# Patient Record
Sex: MALE | Race: OTHER | Employment: UNEMPLOYED | URBAN - METROPOLITAN AREA
[De-identification: names, ages, dates, MRNs, and addresses within clinical notes are randomized per-mention and may not be internally consistent; named-entity substitution may affect disease eponyms.]

---

## 2021-04-25 ENCOUNTER — APPOINTMENT (OUTPATIENT)
Dept: CT IMAGING | Facility: HOSPITAL | Age: 36
End: 2021-04-25
Attending: EMERGENCY MEDICINE
Payer: MEDICARE

## 2021-04-25 ENCOUNTER — APPOINTMENT (OUTPATIENT)
Dept: GENERAL RADIOLOGY | Facility: HOSPITAL | Age: 36
End: 2021-04-25
Attending: EMERGENCY MEDICINE
Payer: MEDICARE

## 2021-04-25 ENCOUNTER — HOSPITAL ENCOUNTER (EMERGENCY)
Facility: HOSPITAL | Age: 36
Discharge: HOME | End: 2021-04-25
Attending: EMERGENCY MEDICINE
Payer: MEDICARE

## 2021-04-25 VITALS
WEIGHT: 255 LBS | SYSTOLIC BLOOD PRESSURE: 136 MMHG | DIASTOLIC BLOOD PRESSURE: 87 MMHG | HEART RATE: 83 BPM | RESPIRATION RATE: 16 BRPM | BODY MASS INDEX: 37.77 KG/M2 | TEMPERATURE: 98 F | HEIGHT: 69 IN | OXYGEN SATURATION: 97 %

## 2021-04-25 DIAGNOSIS — R91.1 PULMONARY NODULE: ICD-10-CM

## 2021-04-25 DIAGNOSIS — R07.89 CHEST WALL PAIN: Primary | ICD-10-CM

## 2021-04-25 PROCEDURE — 96374 THER/PROPH/DIAG INJ IV PUSH: CPT

## 2021-04-25 PROCEDURE — 99285 EMERGENCY DEPT VISIT HI MDM: CPT

## 2021-04-25 PROCEDURE — 80048 BASIC METABOLIC PNL TOTAL CA: CPT | Performed by: EMERGENCY MEDICINE

## 2021-04-25 PROCEDURE — 93010 ELECTROCARDIOGRAM REPORT: CPT | Performed by: EMERGENCY MEDICINE

## 2021-04-25 PROCEDURE — 84484 ASSAY OF TROPONIN QUANT: CPT | Performed by: EMERGENCY MEDICINE

## 2021-04-25 PROCEDURE — 71260 CT THORAX DX C+: CPT | Performed by: EMERGENCY MEDICINE

## 2021-04-25 PROCEDURE — 71045 X-RAY EXAM CHEST 1 VIEW: CPT | Performed by: EMERGENCY MEDICINE

## 2021-04-25 PROCEDURE — 93005 ELECTROCARDIOGRAM TRACING: CPT

## 2021-04-25 PROCEDURE — 85025 COMPLETE CBC W/AUTO DIFF WBC: CPT | Performed by: EMERGENCY MEDICINE

## 2021-04-25 PROCEDURE — 85379 FIBRIN DEGRADATION QUANT: CPT | Performed by: EMERGENCY MEDICINE

## 2021-04-25 RX ORDER — KETOROLAC TROMETHAMINE 15 MG/ML
15 INJECTION, SOLUTION INTRAMUSCULAR; INTRAVENOUS ONCE
Status: COMPLETED | OUTPATIENT
Start: 2021-04-25 | End: 2021-04-25
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
## 2021-04-25 NOTE — ED PROVIDER NOTES
Patient Seen in: [facility] AND [facility] Emergency Department      History   Patient presents with:  Chest Pain Angina    Stated Complaint: cp     HPI/Subjective:   HPI    Pt is 28 yo M with recent history of high blood pressure who presents with substernal ch tender  CV: RRR, no murmurs. +TTP over sternum.  2+ radial pulses b/l  Resp: CTAB, no wheezes or retractions  Ab: soft, nontender, no distension  Extremities: FROM of all extremities, no cyanosis/clubbing/edema  Neuro: CN intact, normal speech, normal gait, acute pulmonary embolism to the level of the first order subsegmental pulmonary artery branches.   2. There is an ovoid nodule in the periphery of the right lower lobe; in a patient of this age, this may be incidental. A follow-up study can be obtained in 1 lifting at home. He feels comfortable with discharge plan with close follow-up. Advised on strict return precautions.                            Disposition and Plan     Clinical Impression:  Chest wall pain  (primary encounter diagnosis)  Pulmonary nodul

## 2023-01-04 ENCOUNTER — APPOINTMENT (OUTPATIENT)
Dept: GENERAL RADIOLOGY | Facility: HOSPITAL | Age: 38
End: 2023-01-04
Payer: COMMERCIAL

## 2023-01-04 ENCOUNTER — HOSPITAL ENCOUNTER (EMERGENCY)
Facility: HOSPITAL | Age: 38
Discharge: HOME | End: 2023-01-04
Attending: EMERGENCY MEDICINE
Payer: COMMERCIAL

## 2023-01-04 VITALS
SYSTOLIC BLOOD PRESSURE: 159 MMHG | HEART RATE: 94 BPM | OXYGEN SATURATION: 98 % | HEIGHT: 69 IN | DIASTOLIC BLOOD PRESSURE: 97 MMHG | WEIGHT: 256 LBS | RESPIRATION RATE: 22 BRPM | TEMPERATURE: 99 F | BODY MASS INDEX: 37.92 KG/M2

## 2023-01-04 DIAGNOSIS — R07.9 RIGHT-SIDED CHEST PAIN: Primary | ICD-10-CM

## 2023-01-04 DIAGNOSIS — R07.9 CHEST PAIN OF UNCERTAIN ETIOLOGY: ICD-10-CM
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
## 2023-01-04 LAB
ANION GAP SERPL CALC-SCNC: 5 MMOL/L (ref 0–18)
ATRIAL RATE: 88 BPM
BASOPHILS # BLD AUTO: 0.07 X10(3) UL (ref 0–0.2)
BASOPHILS NFR BLD AUTO: 0.8 %
BUN BLD-MCNC: 11 MG/DL (ref 7–18)
BUN/CREAT SERPL: 9.6 (ref 10–20)
CALCIUM BLD-MCNC: 9.4 MG/DL (ref 8.5–10.1)
CHLORIDE SERPL-SCNC: 107 MMOL/L (ref 98–112)
CO2 SERPL-SCNC: 29 MMOL/L (ref 21–32)
CREAT BLD-MCNC: 1.15 MG/DL
D DIMER PPP FEU-MCNC: 0.36 UG/ML FEU (ref ?–0.5)
DEPRECATED RDW RBC AUTO: 39.2 FL (ref 35.1–46.3)
EOSINOPHIL # BLD AUTO: 0.1 X10(3) UL (ref 0–0.7)
EOSINOPHIL NFR BLD AUTO: 1.1 %
ERYTHROCYTE [DISTWIDTH] IN BLOOD BY AUTOMATED COUNT: 12.3 % (ref 11–15)
GFR SERPLBLD BASED ON 1.73 SQ M-ARVRAT: 85 ML/MIN/1.73M2 (ref 60–?)
GLUCOSE BLD-MCNC: 92 MG/DL (ref 70–99)
HCT VFR BLD AUTO: 46.9 %
HGB BLD-MCNC: 16 G/DL
IMM GRANULOCYTES # BLD AUTO: 0.02 X10(3) UL (ref 0–1)
IMM GRANULOCYTES NFR BLD: 0.2 %
LYMPHOCYTES # BLD AUTO: 2.05 X10(3) UL (ref 1–4)
LYMPHOCYTES NFR BLD AUTO: 22.6 %
MCH RBC QN AUTO: 29.7 PG (ref 26–34)
MCHC RBC AUTO-ENTMCNC: 34.1 G/DL (ref 31–37)
MCV RBC AUTO: 87.2 FL
MONOCYTES # BLD AUTO: 0.63 X10(3) UL (ref 0.1–1)
MONOCYTES NFR BLD AUTO: 7 %
NEUTROPHILS # BLD AUTO: 6.19 X10 (3) UL (ref 1.5–7.7)
NEUTROPHILS # BLD AUTO: 6.19 X10(3) UL (ref 1.5–7.7)
NEUTROPHILS NFR BLD AUTO: 68.3 %
OSMOLALITY SERPL CALC.SUM OF ELEC: 291 MOSM/KG (ref 275–295)
P AXIS: 69 DEGREES
P-R INTERVAL: 134 MS
PLATELET # BLD AUTO: 356 10(3)UL (ref 150–450)
POTASSIUM SERPL-SCNC: 3.7 MMOL/L (ref 3.5–5.1)
Q-T INTERVAL: 358 MS
QRS DURATION: 90 MS
QTC CALCULATION (BEZET): 433 MS
R AXIS: 16 DEGREES
RBC # BLD AUTO: 5.38 X10(6)UL
SODIUM SERPL-SCNC: 141 MMOL/L (ref 136–145)
T AXIS: 40 DEGREES
TROPONIN I HIGH SENSITIVITY: 14 NG/L
VENTRICULAR RATE: 88 BPM
WBC # BLD AUTO: 9.1 X10(3) UL (ref 4–11)

## 2023-01-04 PROCEDURE — 36415 COLL VENOUS BLD VENIPUNCTURE: CPT

## 2023-01-04 PROCEDURE — 71046 X-RAY EXAM CHEST 2 VIEWS: CPT

## 2023-01-04 PROCEDURE — 85379 FIBRIN DEGRADATION QUANT: CPT | Performed by: EMERGENCY MEDICINE

## 2023-01-04 PROCEDURE — 84484 ASSAY OF TROPONIN QUANT: CPT | Performed by: EMERGENCY MEDICINE

## 2023-01-04 PROCEDURE — 93010 ELECTROCARDIOGRAM REPORT: CPT

## 2023-01-04 PROCEDURE — 99284 EMERGENCY DEPT VISIT MOD MDM: CPT

## 2023-01-04 PROCEDURE — 93005 ELECTROCARDIOGRAM TRACING: CPT

## 2023-01-04 PROCEDURE — 80048 BASIC METABOLIC PNL TOTAL CA: CPT | Performed by: EMERGENCY MEDICINE

## 2023-01-04 PROCEDURE — 99285 EMERGENCY DEPT VISIT HI MDM: CPT

## 2023-01-04 PROCEDURE — 85025 COMPLETE CBC W/AUTO DIFF WBC: CPT | Performed by: EMERGENCY MEDICINE
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
## 2025-03-09 ENCOUNTER — HOSPITAL ENCOUNTER (EMERGENCY)
Age: 40
Discharge: HOME | End: 2025-03-09
Attending: EMERGENCY MEDICINE
Payer: MEDICARE

## 2025-03-09 ENCOUNTER — APPOINTMENT (OUTPATIENT)
Dept: GENERAL RADIOLOGY | Age: 40
End: 2025-03-09
Attending: EMERGENCY MEDICINE
Payer: MEDICARE

## 2025-03-09 VITALS
RESPIRATION RATE: 15 BRPM | TEMPERATURE: 97 F | BODY MASS INDEX: 39.99 KG/M2 | HEIGHT: 69 IN | SYSTOLIC BLOOD PRESSURE: 137 MMHG | OXYGEN SATURATION: 99 % | DIASTOLIC BLOOD PRESSURE: 80 MMHG | HEART RATE: 75 BPM | WEIGHT: 270 LBS

## 2025-03-09 DIAGNOSIS — S50.02XA CONTUSION OF LEFT ELBOW, INITIAL ENCOUNTER: Primary | ICD-10-CM

## 2025-03-09 PROCEDURE — 73080 X-RAY EXAM OF ELBOW: CPT | Performed by: EMERGENCY MEDICINE

## 2025-03-09 PROCEDURE — 99283 EMERGENCY DEPT VISIT LOW MDM: CPT

## 2025-03-09 PROCEDURE — 99284 EMERGENCY DEPT VISIT MOD MDM: CPT

## 2025-03-09 NOTE — ED QUICK NOTES
Pt has full ROM but extends elbow more slowly bc of some pain. No swelling or skin discoloration noted. No head injury with fall. Pt respectfully declined sling at this time.

## 2025-03-09 NOTE — DISCHARGE INSTRUCTIONS
Sling for comfort  Rest  Elevate your injured extremity  Apply cool compresses for 20 minutes at a time.  Tylenol or motrin for pain  Follow up with your doctor within a few days as planned

## 2025-03-09 NOTE — ED PROVIDER NOTES
Patient Seen in: [facility] In [place]      History     Chief Complaint   Patient presents with    Fall    Arm or Hand Injury     Stated Complaint: fall on stairs on Wednesday, landed on left elbow. Pain worse with movement. De*    Subjective:   HPI      Patient fell on his stairs at home on Wednesday.  He reports striking his elbow on the step.  He has had pain and points to the olecranon as location of the discomfort ever since.  Hurts to move.    Objective:     History reviewed. No pertinent past medical history.           History reviewed. No pertinent surgical history.             Social History     Socioeconomic History    Marital status: Single   Tobacco Use    Smoking status: Never    Smokeless tobacco: Never   Vaping Use    Vaping status: Never Used   Substance and Sexual Activity    Alcohol use: Never    Drug use: Never     Social Drivers of Health     Food Insecurity: Food Insecurity Present (12/2/2024)    Received from [facility]    Food Insecurity     Within the past 30 days, I worried whether my food would run out before I got money to buy more. / En los últimos 30 días, me preocupó que la comida se podía acabar antes de tener dinero para compr...: Sometimes true / A veces     Within the past 30 days, the food that I bought just didn't last, and I didn't have money to get more. / En los últimos 30 días, La comida que compré no rindió lo suficiente, y no tenía dinero para...: Sometimes true / A veces   Housing Stability: Unknown (12/2/2024)    Received from [facility]    Housing Stability Vital Sign     Unable to Pay for Housing in the Last Year: Patient declined                  Physical Exam     ED Triage Vitals [03/09/25 1800]   /90   Pulse 79   Resp 16   Temp 97.4 °F (36.3 °C)   Temp src    SpO2 98 %   O2 Device None (Room air)       Current Vitals:   Vital Signs  BP: 150/90  Pulse: 79  Resp: 16  Temp: 97.4 °F (36.3 °C)    Oxygen  Therapy  SpO2: 98 %  O2 Device: None (Room air)        Physical Exam  On examination, this alert adult man  Elbow: There is point tenderness over the olecranon.  Very mild tenderness noted over the epicondyles.  There is discomfort with flexion extension of the elbow and movement is guarded.  No extraordinary discomfort with pronation supination of the forearm.  Radial pulses strong.  Strength in hand is excellent.  Sensation intact to light touch    ED Course   Labs Reviewed - No data to display                MDM      Patient with a contusion to the elbow.  Traumatic olecranon bursitis include the differential.  Fracture also considered.    X-ray elbow  I personally reviewed the actual radiographs themselves and my individual interpretation shows no fracture  radiologist's formal interpretation which I have reviewed  FINDINGS:    BONES:  Normal.  No significant arthropathy or acute abnormality.   SOFT TISSUES:  Negative.  No visible soft tissue swelling.   EFFUSION:  None visible.   OTHER:  Negative.       I reviewed the workup with the patient.  I recommend:  Sling for comfort  Rest  Elevate your injured extremity  Apply cool compresses for 20 minutes at a time.  Tylenol or motrin for pain  Follow up with your doctor within a few days as planned        Patient has an appoint with his primary care doctor on Tuesday.  If persistently uncomfortable, repeat x-rays and orthopedic consultation could be considered    Medical Decision Making      Disposition and Plan     Clinical Impression:  1. Contusion of left elbow, initial encounter         Disposition:  Discharge  3/9/2025  6:39 pm    Follow-up:  Moncho Christianson  [address] [place] [id number] [phone]    Follow up            Medications Prescribed:  There are no discharge medications for this patient.          Supplementary Documentation: